# Patient Record
Sex: MALE | Race: WHITE | Employment: UNEMPLOYED | URBAN - METROPOLITAN AREA
[De-identification: names, ages, dates, MRNs, and addresses within clinical notes are randomized per-mention and may not be internally consistent; named-entity substitution may affect disease eponyms.]

---

## 2017-01-13 ENCOUNTER — CARDPULM VISIT (OUTPATIENT)
Dept: CARDIAC REHAB | Facility: HOSPITAL | Age: 67
End: 2017-01-13
Attending: INTERNAL MEDICINE
Payer: COMMERCIAL

## 2017-01-13 PROCEDURE — 93798 PHYS/QHP OP CAR RHAB W/ECG: CPT

## 2017-01-18 ENCOUNTER — CARDPULM VISIT (OUTPATIENT)
Dept: CARDIAC REHAB | Facility: HOSPITAL | Age: 67
End: 2017-01-18
Attending: INTERNAL MEDICINE
Payer: COMMERCIAL

## 2017-01-18 PROCEDURE — 93798 PHYS/QHP OP CAR RHAB W/ECG: CPT

## 2017-01-27 ENCOUNTER — CARDPULM VISIT (OUTPATIENT)
Dept: CARDIAC REHAB | Facility: HOSPITAL | Age: 67
End: 2017-01-27
Attending: INTERNAL MEDICINE
Payer: COMMERCIAL

## 2017-01-27 PROCEDURE — 93798 PHYS/QHP OP CAR RHAB W/ECG: CPT

## 2017-02-03 ENCOUNTER — CARDPULM VISIT (OUTPATIENT)
Dept: CARDIAC REHAB | Facility: HOSPITAL | Age: 67
End: 2017-02-03
Attending: INTERNAL MEDICINE
Payer: COMMERCIAL

## 2017-02-03 PROCEDURE — 93798 PHYS/QHP OP CAR RHAB W/ECG: CPT

## 2017-02-15 ENCOUNTER — CARDPULM VISIT (OUTPATIENT)
Dept: CARDIAC REHAB | Facility: HOSPITAL | Age: 67
End: 2017-02-15
Attending: INTERNAL MEDICINE
Payer: COMMERCIAL

## 2017-02-15 PROCEDURE — 93798 PHYS/QHP OP CAR RHAB W/ECG: CPT

## 2017-03-03 ENCOUNTER — CARDPULM VISIT (OUTPATIENT)
Dept: CARDIAC REHAB | Facility: HOSPITAL | Age: 67
End: 2017-03-03
Attending: INTERNAL MEDICINE
Payer: COMMERCIAL

## 2017-03-03 PROCEDURE — 93798 PHYS/QHP OP CAR RHAB W/ECG: CPT

## 2017-07-14 PROBLEM — M19.011 OSTEOARTHRITIS OF RIGHT SHOULDER: Status: ACTIVE | Noted: 2017-07-14

## 2018-04-06 PROBLEM — M17.0 PRIMARY OSTEOARTHRITIS OF BOTH KNEES: Status: ACTIVE | Noted: 2018-04-06

## 2018-07-23 PROBLEM — L72.3 SEBACEOUS CYST OF EAR: Status: ACTIVE | Noted: 2018-07-23

## 2018-11-15 PROCEDURE — 88305 TISSUE EXAM BY PATHOLOGIST: CPT | Performed by: SURGERY

## 2019-01-10 PROCEDURE — 88304 TISSUE EXAM BY PATHOLOGIST: CPT | Performed by: SURGERY

## 2019-03-14 PROBLEM — I77.9 CAROTID ARTERIAL DISEASE (HCC): Status: ACTIVE | Noted: 2019-03-14

## 2019-04-29 PROBLEM — F07.81 POSTCONCUSSIVE SYNDROME: Status: ACTIVE | Noted: 2019-04-29

## 2019-05-02 PROCEDURE — 84165 PROTEIN E-PHORESIS SERUM: CPT | Performed by: OTHER

## 2019-05-02 PROCEDURE — 83921 ORGANIC ACID SINGLE QUANT: CPT | Performed by: OTHER

## 2019-05-02 PROCEDURE — 83883 ASSAY NEPHELOMETRY NOT SPEC: CPT | Performed by: OTHER

## 2019-05-02 PROCEDURE — 86334 IMMUNOFIX E-PHORESIS SERUM: CPT | Performed by: OTHER

## 2019-05-02 PROCEDURE — 84425 ASSAY OF VITAMIN B-1: CPT | Performed by: OTHER

## 2019-05-02 PROCEDURE — 86618 LYME DISEASE ANTIBODY: CPT | Performed by: OTHER

## 2019-08-15 PROBLEM — R00.2 PALPITATIONS: Status: ACTIVE | Noted: 2019-08-15

## 2019-10-15 ENCOUNTER — HOSPITAL ENCOUNTER (EMERGENCY)
Facility: HOSPITAL | Age: 69
Discharge: HOME | End: 2019-10-15
Attending: EMERGENCY MEDICINE
Payer: COMMERCIAL

## 2019-10-15 ENCOUNTER — HOSPITAL ENCOUNTER (OUTPATIENT)
Dept: GENERAL RADIOLOGY | Age: 69
Discharge: HOME | End: 2019-10-15
Attending: ORTHOPAEDIC SURGERY
Payer: COMMERCIAL

## 2019-10-15 VITALS
RESPIRATION RATE: 20 BRPM | WEIGHT: 271 LBS | BODY MASS INDEX: 38.8 KG/M2 | HEIGHT: 70 IN | OXYGEN SATURATION: 95 % | TEMPERATURE: 98 F | DIASTOLIC BLOOD PRESSURE: 71 MMHG | HEART RATE: 77 BPM | SYSTOLIC BLOOD PRESSURE: 155 MMHG

## 2019-10-15 DIAGNOSIS — T78.40XA ALLERGIC REACTION, INITIAL ENCOUNTER: Primary | ICD-10-CM

## 2019-10-15 DIAGNOSIS — G89.29 CHRONIC LEFT SHOULDER PAIN: ICD-10-CM

## 2019-10-15 DIAGNOSIS — M25.512 CHRONIC LEFT SHOULDER PAIN: ICD-10-CM

## 2019-10-15 PROCEDURE — 94640 AIRWAY INHALATION TREATMENT: CPT

## 2019-10-15 PROCEDURE — 99284 EMERGENCY DEPT VISIT MOD MDM: CPT

## 2019-10-15 PROCEDURE — 73030 X-RAY EXAM OF SHOULDER: CPT | Performed by: ORTHOPAEDIC SURGERY

## 2019-10-15 PROCEDURE — 93005 ELECTROCARDIOGRAM TRACING: CPT

## 2019-10-15 PROCEDURE — 93010 ELECTROCARDIOGRAM REPORT: CPT

## 2019-10-15 RX ORDER — PREDNISONE 20 MG/1
60 TABLET ORAL ONCE
Status: COMPLETED | OUTPATIENT
Start: 2019-10-15 | End: 2019-10-15

## 2019-10-15 RX ORDER — FAMOTIDINE 10 MG/ML
20 INJECTION, SOLUTION INTRAVENOUS ONCE
Status: DISCONTINUED | OUTPATIENT
Start: 2019-10-15 | End: 2019-10-15

## 2019-10-15 RX ORDER — IPRATROPIUM BROMIDE AND ALBUTEROL SULFATE 2.5; .5 MG/3ML; MG/3ML
3 SOLUTION RESPIRATORY (INHALATION) ONCE
Status: COMPLETED | OUTPATIENT
Start: 2019-10-15 | End: 2019-10-15

## 2019-10-15 RX ORDER — METHYLPREDNISOLONE SODIUM SUCCINATE 125 MG/2ML
125 INJECTION, POWDER, LYOPHILIZED, FOR SOLUTION INTRAMUSCULAR; INTRAVENOUS ONCE
Status: DISCONTINUED | OUTPATIENT
Start: 2019-10-15 | End: 2019-10-15

## 2019-10-15 RX ORDER — DIPHENHYDRAMINE HYDROCHLORIDE 50 MG/ML
25 INJECTION INTRAMUSCULAR; INTRAVENOUS ONCE
Status: DISCONTINUED | OUTPATIENT
Start: 2019-10-15 | End: 2019-10-15

## 2019-10-15 RX ORDER — METHYLPREDNISOLONE 4 MG/1
TABLET ORAL
Qty: 1 PACKAGE | Refills: 0 | Status: SHIPPED | OUTPATIENT
Start: 2019-10-15 | End: 2020-03-12 | Stop reason: ALTCHOICE

## 2019-10-15 NOTE — ED INITIAL ASSESSMENT (HPI)
\"I woke up with trouble breathing, back of throat itching, my eyes are itching. \" Pt denies swelling to throat and tongue, speaking in full sentences.  States he took Advair and Ventolin inhaler and \"liquid Benadryl\", 30 mins PTA

## 2019-10-15 NOTE — ED PROVIDER NOTES
Patient Seen in: [facility] Emergency Department      History   Patient presents with:   Allergic Rxn Allergies (immune)    Stated Complaint: ALLERGIC RXN, INDIGESTION    HPI    Trace Peguero is a pleasant 80-year-old gentleman presenting to the emerge par IV and cardiac monitoring at this time and understand the risks and benefits of this this decision he is refusing. He is okay with receiving oral steroids but he is requesting a breathing treatment of albuterol because he says this is what fixes him.   He

## 2024-03-12 NOTE — H&P
Informed consent:  Patient seen and examined independently. H and P reviewed. No changes in H and P. Risks and benefits of procedure were discussed with patient. Airway examined.  Patient is ASA class 2 and Mallampati class 2. Pt is appropriate for conscious sedation. No history of difficult airway. The risks, benefits, indications and alternatives of left heart catheterization and coronary angigoraphy with possible percutaneous coronary intervention were discussed. The risks include, but are not limited to: bleeding, anemia requiring blood transfusion, allergic reaction, hypotension, infection, vascular injury, injury to upper or lower extremity requiring endovascular or open surgical repair,  kidney failure, stroke, cardiac or pulmonary artery perforation, pericardial effusion and tamponade requiring pericardiocentesis, myocardial infarction (heart attack), respiratory failure needing intubation, cardiac arrest, need for emergent surgery, and death. Benefits of the procedure include: symptomatic improvement, diagnosis of coronary artery disease or other forms of heart disease and possibly prevention of myocardial infarction. Alternatives to the procedure include: not performing cardiac catheterization, treatment with medications only, and observation. The patient and any accompanying family have considered the above, all questions have been answered to the best of my ability to their satisfaction, and have decided to proceed with the procedure. Appropriate candidate for Sedation/Analgesia: Yes. Plan for Sedation reviewed: Yes. Explained Anesthesia options and attendant risks, and have determined patient is an appropriate candidate. Yes. Consent for Sedation obtained: Yes. Patient reevaluated immediately prior to Sedation/Analgesia: Yes.

## 2024-03-13 RX ORDER — FLUTICASONE PROPIONATE 50 MCG
1 SPRAY, SUSPENSION (ML) NASAL AS NEEDED
COMMUNITY

## 2024-03-13 RX ORDER — PREDNISONE 20 MG/1
20 TABLET ORAL 3 TIMES DAILY
COMMUNITY

## 2024-03-13 RX ORDER — AZITHROMYCIN 250 MG/1
250 TABLET, FILM COATED ORAL DAILY
COMMUNITY
End: 2024-03-16

## 2024-03-13 NOTE — PAT NURSING NOTE
Per PAT encounter/MyChart message sent to pt:    PreOp Instructions     You are scheduled for: a Cardiac Procedure     Date of Procedure: 03/20/24 Wednesday     Diet Instructions: Do not eat or drink anything after midnight     Medications: Take Plavix 75mg the day of your procedure, Medications you are allowed to take can be taken with a sip of water the morning of your procedure    Sleep Apnea: If you have sleep apnea, please bring your mask and tubing     Skin Prep: Shower with antibacterial soap using a clean washcloth, prior to procedure     Arrival Time: The day prior to your procedure (Tuesday) you will receive a phone call before 6:00 pm with your arrival time. If you haven't received a phone call, please check your voicemail messages., If you did not receive a voice mail and it is after 6:00 pm, please call the nursing supervisor at [phone].    Driving After Procedure: If sedation is given, you WILL NOT be able to drive home. You will need a responsible adult  to drive you home., Cannot take uber or cab unless approved by physician     Discharge Teaching: Your nurse will give you specific instructions before discharge, Most people can resume normal activities in 2-3 days, Any questions, please call the physician's office      parking is available starting at 6 am or park in the [place] parking garage at [facility]. Check in at the [facility] reception desk. Our  will be there to check you in for your procedure. Please bring your insurance cards and ID with you.                                                                                                                                      Please DO NOT respond to this message, the inbasket is not monitored for messages. For any questions, please call the physician's office.

## 2024-03-20 ENCOUNTER — HOSPITAL ENCOUNTER (OUTPATIENT)
Dept: INTERVENTIONAL RADIOLOGY/VASCULAR | Facility: HOSPITAL | Age: 74
Discharge: HOME | End: 2024-03-20
Attending: INTERNAL MEDICINE | Admitting: INTERNAL MEDICINE
Payer: MEDICARE

## 2024-03-20 VITALS
HEART RATE: 66 BPM | OXYGEN SATURATION: 96 % | RESPIRATION RATE: 18 BRPM | SYSTOLIC BLOOD PRESSURE: 140 MMHG | DIASTOLIC BLOOD PRESSURE: 62 MMHG | WEIGHT: 289 LBS | BODY MASS INDEX: 42.8 KG/M2 | TEMPERATURE: 97 F | HEIGHT: 69 IN

## 2024-03-20 DIAGNOSIS — I47.20 V-TACH (HCC): ICD-10-CM

## 2024-03-20 PROCEDURE — 99152 MOD SED SAME PHYS/QHP 5/>YRS: CPT | Performed by: INTERNAL MEDICINE

## 2024-03-20 PROCEDURE — B2151ZZ FLUOROSCOPY OF LEFT HEART USING LOW OSMOLAR CONTRAST: ICD-10-PCS | Performed by: INTERNAL MEDICINE

## 2024-03-20 PROCEDURE — 99153 MOD SED SAME PHYS/QHP EA: CPT | Performed by: INTERNAL MEDICINE

## 2024-03-20 PROCEDURE — B2111ZZ FLUOROSCOPY OF MULTIPLE CORONARY ARTERIES USING LOW OSMOLAR CONTRAST: ICD-10-PCS | Performed by: INTERNAL MEDICINE

## 2024-03-20 PROCEDURE — 4A023N7 MEASUREMENT OF CARDIAC SAMPLING AND PRESSURE, LEFT HEART, PERCUTANEOUS APPROACH: ICD-10-PCS | Performed by: INTERNAL MEDICINE

## 2024-03-20 PROCEDURE — 4A033BC MEASUREMENT OF ARTERIAL PRESSURE, CORONARY, PERCUTANEOUS APPROACH: ICD-10-PCS | Performed by: INTERNAL MEDICINE

## 2024-03-20 PROCEDURE — 93458 L HRT ARTERY/VENTRICLE ANGIO: CPT | Performed by: INTERNAL MEDICINE

## 2024-03-20 PROCEDURE — 93799 UNLISTED CV SVC/PROCEDURE: CPT | Performed by: INTERNAL MEDICINE

## 2024-03-20 RX ORDER — SODIUM CHLORIDE 9 MG/ML
INJECTION, SOLUTION INTRAVENOUS
Status: DISCONTINUED | OUTPATIENT
Start: 2024-03-21 | End: 2024-03-20 | Stop reason: HOSPADM

## 2024-03-20 RX ORDER — ASPIRIN 81 MG/1
324 TABLET, CHEWABLE ORAL ONCE
Status: DISCONTINUED | OUTPATIENT
Start: 2024-03-20 | End: 2024-03-20 | Stop reason: HOSPADM

## 2024-03-20 RX ORDER — HEPARIN SODIUM 5000 [USP'U]/ML
INJECTION, SOLUTION INTRAVENOUS; SUBCUTANEOUS
Status: COMPLETED
Start: 2024-03-20 | End: 2024-03-20

## 2024-03-20 RX ORDER — LIDOCAINE HYDROCHLORIDE 10 MG/ML
INJECTION, SOLUTION EPIDURAL; INFILTRATION; INTRACAUDAL; PERINEURAL
Status: COMPLETED
Start: 2024-03-20 | End: 2024-03-20

## 2024-03-20 RX ORDER — VERAPAMIL HYDROCHLORIDE 2.5 MG/ML
INJECTION, SOLUTION INTRAVENOUS
Status: COMPLETED
Start: 2024-03-20 | End: 2024-03-20

## 2024-03-20 RX ORDER — NITROGLYCERIN 20 MG/100ML
INJECTION INTRAVENOUS
Status: COMPLETED
Start: 2024-03-20 | End: 2024-03-20

## 2024-03-20 RX ORDER — MIDAZOLAM HYDROCHLORIDE 1 MG/ML
INJECTION INTRAMUSCULAR; INTRAVENOUS
Status: COMPLETED
Start: 2024-03-20 | End: 2024-03-20

## 2024-03-20 NOTE — PROGRESS NOTES
Pt post LHC. Pt awake, vss. Right radial artery access site is CDI with TR band in place.    Recovery complete per protocol. Vss. TR band removed. Right radial site remains soft with no signs of bleeding or hematoma. Pressure bandage applied. Discharge instructions reviewed ,iv dc'd and pt discharged home with son driving.

## 2024-03-20 NOTE — PROCEDURES
OPERATIVE REPORT - CARDIAC CATHETERIZATION    Date of Procedure: 3/20/2024     Performing Physician: Michael Tolentino MD    Referring Physician: Blair Orta MD    Pre-Procedure Diagnosis:   1. CAD h/o MI and PCI LAD, RCA (2014)  2. WRIGHT  3. Nondiagnostic stress test    Post-Procedure Diagnosis:  1. Same     Findings:  1. Left main: No stenosis  2. LAD: Transapical, high proximal diagonal branch mild plaquing < 30%, mid long stent widely patent, then short segment ~ 40% stenosis, distal stent widely patent  3. Ramus: large vessel, luminal irregularities < 30%   3. Left circumflex: Proximal 30-40%, several OM branches, luminal irregularities < 30%  4. RCA: Dominant to PDA, proximal stent patent with < 30% ISR, mid 50-60%, PDA and RPL luminal irregularities    5. LV: EF >70%, no RWMA, trace MR, LVEDP 15-20 mmHg. No LV-Ao gradient on pullback.     Intervention:  iFR RCA 0.96 (normal)    Conclusions   Patient LAD and RCA stents. Moderate mid RCA stenosis negative by iFR. Otherwise nonobstructive CAD  Mildly elevated LVEDP 15-20 mmHg may be consistent with diastolic heart failure.  Hyperdynamic LV systolic function without significant LV-Ao gradient.     Recommendations:  Gentle IVF post cath  Remove the TR band per protocol  Optimize medical therapy. Consider diuretic therapy. Results conveyed to Dr. Orta.     -----------------------    Procedures performed:   1. Left heart catheterization  2. Selective bilateral coronary angiography  3. Moderate sedation  4. Access of right radial artery  5. iFR RCA    Indications: This is a 74 year old male presenting for left heart catheterization with coronary angiography to evaluate for obstructive CAD.     Description of Procedure: Informed consent was obtained from the patient in writing. The risks and benefits of the procedure were reviewed in detail with the patient, including but not limited to the risk of myocardial infarction, stroke, renal failure,  bleeding, and death. After a thorough discussion of these risks and benefits, the patient agreed to proceed and signed an informed consent document. The patient was brought to the cardiac catheterization laboratory in the fasting state. Moderate sedation was employed using a total of IV Versed 3 mg and IV fentanyl 75 mcg in divided doses.  I directly observed the patient from 1434 to 1511, and an independent trained observer was present and assisted in the monitoring of the patient's level of consciousness and physiological status, heart rate, blood pressure, oximetry, and rhythm. All operators present for the case performed standard pre-procedural prep including hand washing, sterile gloves, gown, mask, and cap. All aspects of the maximum sterile barrier technique were followed. A preprocedural time out was performed with all physicians, technologists, and nurses involved with the procedure. 1% lidocaine was infiltrated subcutaneously in the right wrist for local anesthesia. Access was obtained in the right radial artery with a 5/6F Slender Glidesheath using the Seldinger technique and a micropuncture needle with a single anterior wall puncture. Standard vasodilator cocktail was given with heparin 5000 units, nitroglycerin 200 mcg and verapamil 2.5 mg through the arterial sheath. A 6F TIG catheter was advanced over a J tipped wire through the arterial sheath and placed in the aortic root, then used to selectively engage the left coronary artery. Standard angiographic views were taken in orthogonal projections. The RCA was then engaged and standard angiographic views were performed. The catheter was then exchanged for a pigtail and prolapsed into the LV over a wire and placed at the base of the LV. LV systolic and end diastolic pressure was then recorded. LV gram performed. The catheter was pulled back and pullback measurements of LV and aortic pressure and recorded. The catheter was then removed over a wire and  exchanged for a 6F JR4 guide catheter which then cannulated the RCA. Weight based heparin given to target therapeutic ACT and was monitored routinely throughout the case with additional heparin given as needed to keep ACT in therapeutic range at least > 250 sec. The flow wire (eFuneral, Omniwire system) was prepared in the usual fashion. The wire was advanced with the sensor at the tip of the guide. The catheter was flushed with saline. Pd/Pa was equalized. The wire was then advanced to the distal RCA. Several measurements of iFR were taken. The wire was then pulled back to the catheter and no pressure drift was confirmed. The wire was removed. Final angiography showed no disruption to the vessel.  At the conclusion of the procedure, all catheters and wires were removed over a wire. The arterial sheath was removed and hemostasis achieved with standard TR band using patent hemostasis technique. There was no bleeding or hematoma. The patient tolerated the procedure well without complication. EBL <10 mL. Total contrast ~ 150 mL. See procedure log for fluoro time and radiation dose.      Michael Tolentino MD  Interventional Cardiology  [facility]

## 2024-03-20 NOTE — DISCHARGE INSTRUCTIONS
A medication called furosemide will be called into your pharmacy. Take as directed.     Lab work has been ordered for you to have done in 1 week.

## 2024-03-21 ENCOUNTER — PATIENT OUTREACH (OUTPATIENT)
Dept: CASE MANAGEMENT | Age: 74
End: 2024-03-21

## 2024-03-21 NOTE — PROGRESS NOTES
[facility] apt request (discharged 03/20)    Dr Blair Orta  Cardiology, Structural Heart  [place] [id number]  [phone]  Follow up 2-3 weeks  Apt made:  Thu 04/11 @10:00am w/WILLA Lui  Confirmed w/pt  Closing encounter

## (undated) NOTE — ED AVS SNAPSHOT
Cory Feroz   MRN: [id number]    Department:  [facility] Emergency Department   Date of Visit:  10/15/2019           Disclosure     Insurance plans vary and the physician(s) referred by the ER may not be covered by your plan.  Please conta tell this physician (or your personal doctor if your instructions are to return to your personal doctor) about any new or lasting problems. The primary care or specialist physician will see patients referred from the [facility] Emergency Department.  Emily Dumont